# Patient Record
Sex: FEMALE | Race: WHITE | NOT HISPANIC OR LATINO | Employment: OTHER | URBAN - METROPOLITAN AREA
[De-identification: names, ages, dates, MRNs, and addresses within clinical notes are randomized per-mention and may not be internally consistent; named-entity substitution may affect disease eponyms.]

---

## 2017-07-25 ENCOUNTER — TELEPHONE (OUTPATIENT)
Dept: HEMATOLOGY/ONCOLOGY | Facility: CLINIC | Age: 72
End: 2017-07-25

## 2017-07-25 NOTE — TELEPHONE ENCOUNTER
----- Message from Polo Hamilton sent at 7/25/2017 10:28 AM CDT -----  Contact: Tarah Monson from Dr. Flakito Rascon would like a call back from staff in ref to pt refferal     Tarah can be reached at [phone]

## 2017-08-24 ENCOUNTER — OFFICE VISIT (OUTPATIENT)
Dept: HEMATOLOGY/ONCOLOGY | Facility: CLINIC | Age: 72
End: 2017-08-24
Payer: COMMERCIAL

## 2017-08-24 VITALS
SYSTOLIC BLOOD PRESSURE: 171 MMHG | OXYGEN SATURATION: 97 % | BODY MASS INDEX: 27.88 KG/M2 | WEIGHT: 147.69 LBS | HEIGHT: 61 IN | DIASTOLIC BLOOD PRESSURE: 90 MMHG | RESPIRATION RATE: 18 BRPM | HEART RATE: 64 BPM | TEMPERATURE: 98 F

## 2017-08-24 DIAGNOSIS — C90.00 MULTIPLE MYELOMA NOT HAVING ACHIEVED REMISSION: Primary | ICD-10-CM

## 2017-08-24 PROCEDURE — 1159F MED LIST DOCD IN RCRD: CPT | Mod: S$GLB,,, | Performed by: INTERNAL MEDICINE

## 2017-08-24 PROCEDURE — 3008F BODY MASS INDEX DOCD: CPT | Mod: S$GLB,,, | Performed by: INTERNAL MEDICINE

## 2017-08-24 PROCEDURE — 99999 PR PBB SHADOW E&M-EST. PATIENT-LVL III: CPT | Mod: PBBFAC,,, | Performed by: INTERNAL MEDICINE

## 2017-08-24 PROCEDURE — 1126F AMNT PAIN NOTED NONE PRSNT: CPT | Mod: S$GLB,,, | Performed by: INTERNAL MEDICINE

## 2017-08-24 PROCEDURE — 99215 OFFICE O/P EST HI 40 MIN: CPT | Mod: S$GLB,,, | Performed by: INTERNAL MEDICINE

## 2017-08-24 RX ORDER — CALCIUM CARBONATE/VITAMIN D3 600MG-5MCG
1 TABLET ORAL DAILY
COMMUNITY

## 2017-08-24 NOTE — PROGRESS NOTES
Subjective:       Patient ID: Sarah Skelton is a 72 y.o. female.    Chief Complaint: Multiple myeloma not having achieved remission    Sarah presents with her family (, daughter, and son-in-law) for evaluation of her IgG lambda multiple myeloma. She was diagnosed in November 2015 after presenting with severe fatigue, anemia, and renal failure. She started therapy with velcade, cytoxan and dexamethasone November 23, 2016 and remained on therapy until late 2016 when she started carfilzomib and dexamethasone. She did not tolerate the addition of Revlimid to Velcade due to severe side effects even at reduced dosing including cytopenias and rash. Her renal function has improved from a creatinine of 10-11 to 3-4, though never returned to normal. Urine lambda free light chains decreased from 10,000 to 4,000. She never required dialysis support. Her chronic renal failure may be a contributing factor in regards to her anemia and she is on erythropoietin support. Outside records indicate transfusion support of at least 9 units of PRBC near time of diagnosis.    Patient is here today for follow up, and was last seen when she established care with us for transplant discussion in December 2016. At that time patient was not keen on the idea of transplant, and the recommendation was made to continue carfilzomib/dexamethasone, reassess response, and if patient progressed and decided against transplant, to consider daratumumab or melphalan/thalidomide. Since that visit, patient has continued carfilzomib/dex until a 4.5 month interruption February-July 2017 due to complication of diverticulitis including perforation and colon resection on May 15, 2017. She had bone marrow restaging bone marrow biopsy on 6/5/17, which showed 50-60% plasma cells and normal cytogenetics. Her myeloma likely progressed through her treatment break. SPEP, immunoglobulins, FLCs not available in the records provided. Labs from 8/22/17 show WBC 4.5, Hgb  9.3, Plt 112, Cr 2.92, otherwise unremarkable.    Since resuming treatment on July 3rd, patient has continued to tolerate it well. She has grade 1 residual neuropathy (since Velcade, unchanged) but otherwise denies bone pain, weight loss. She is able to perform her routine daily activities without limitation. She states she does get mild dyspnea on exertion, but otherwise denies SOB, chest pain, bone pain, abdominal pain. Appetite is normal. Weight is stable. She continues to receive Procrit for her anemia.       Medical History  Hypertension  Anemia    Surgical History  Total Abdominal Hysterectomy  Colon resection for diverticular perforation    HPI  Review of Systems   Constitutional: Negative.    HENT: Negative.    Eyes: Negative.    Respiratory: Negative.    Cardiovascular: Negative.    Gastrointestinal: Negative.    Endocrine: Negative.    Genitourinary: Negative.    Musculoskeletal: Negative.    Skin: Negative.    Allergic/Immunologic: Negative.  Negative for environmental allergies, food allergies and immunocompromised state.   Neurological: Negative.         Mild BLE numbness   Hematological: Negative for adenopathy. Does not bruise/bleed easily.       Objective:       Vitals:    08/24/17 1429   BP: (!) 171/90   Pulse: 64   Resp: 18   Temp: 98.2 °F (36.8 °C)       Physical Exam   Constitutional: She is oriented to person, place, and time. She appears well-developed and well-nourished.   HENT:   Head: Normocephalic and atraumatic.   Right Ear: External ear normal.   Left Ear: External ear normal.   Nose: Nose normal.   Mouth/Throat: Oropharynx is clear and moist. No oropharyngeal exudate.   Eyes: Conjunctivae and EOM are normal. Pupils are equal, round, and reactive to light. No scleral icterus.   Neck: Normal range of motion. Neck supple. No JVD present.   Cardiovascular: Normal rate, regular rhythm, normal heart sounds and intact distal pulses.    No murmur heard.  Pulmonary/Chest: Effort normal and breath  sounds normal. No respiratory distress.   Abdominal: Soft. Bowel sounds are normal. She exhibits no distension. There is no hepatosplenomegaly. There is no tenderness.   Musculoskeletal: Normal range of motion. She exhibits no edema.   Neurological: She is alert and oriented to person, place, and time. No cranial nerve deficit.   Skin: Skin is warm and dry. No rash noted.   Psychiatric: She has a normal mood and affect. Her behavior is normal.   Nursing note and vitals reviewed.        Labs 8/22/17 (OSH):  WBC 4.5   Hgb 9.3  Hct 28.7  Plt 112  BUN 32.1  Cr 2.92  Glucose 128  Total protein 6.0  Albumin 4.0    All other labs normal. Myeloma labs not available.    Bone marrow biopsy results from 6/5/17 reviewed, as per HPI, scanned in media tab.    Assessment:   Mrs. Skelton is a 72 yof here for evaluation of IgG lambda multiple myeloma with associated renal disease and anemia, which have been stable. She likely had progression of diseas after her 4.5 month treatment break, however we do not have trend of M protein, FLCs, IgGs to review. Her most recent bone marrow on 6/5/17 does show 50-60% plasma cells, normal cytogenetics. She recently resumed kyprolis/dexamethasone July 3, 2017 and tolerating well. Continues with Procrit for her anemia, associated FISH.  She is open to transplant. We discussed again that she would need a response of  <20% plasma cells in bone marrow before proceeding to transplant. We would consider transplant even at age 71 with myeloma induced renal failure.     Plan:       - Recommend adding Pomalyst to her regimen to proceed with triple-drug therapy. Discussed that though Pomalyst is in same class as Revlimid, it has a much better tolerated side effect profile and patient agreeable to start.  - Recommend 4 cycles of Pomalyst/Kyprolis/Dexamethasone and then restaging marrow to assess candidacy for transplant. We would like to perform bone marrow here at [facility] so please inform us when patient is  scheduled for her last cycle, so that we may schedule her follow up appointment with us and bone marrow biopsy to start transplant planning.  - Also discussed daratumumab as an alternative adjunct agent should she have issues/intolerance to Pomalyst. Regimen would be Daratumumab/Kyprolis/Dexamethasone.  - If patient not a candidate for transplant per BMbx criteria, will recommend continued Pomalyst/Kyprolis/Dexamethasone if well tolerated and patient otherwise responding or Daratumumab/Kyprolis/Dexamethasone until progression.  - Discussed appointment with her dentist to initiate dental clearance process prior to potential autologous stem cell transplant.  - Please provide overall trend of her SPEP, FLCs, immunoglobulins for next carmelo.    Patient seen and staffed with Dr. Calzada.    Karley Conti MD  Heme-Onc Fellow    If Sarah decides against transplant, I would consider daratumumab and/or melphalan/thalidomide for future treatment options.    We discussed that the best treatment options at this time is what the patient is most comfortable with, keeping quality of life as a priority.    Visit 40 minutes, >50% counseling      Attestation:  Patient personally seen and evaluated with Dr. Conti. Agree with above assessment and plan. We discussed treatment options of Pomalyst/Kyprolis/Dexamethasone and Daratumumab/Kyprolis/Dexamethasone at length.  Sarah still has reservation about transplant primarily: 1. Becoming very sick from transplant side effects and 2. Being away from home for the month required for transplant. We discussed potential transplant side effects at length.  We discussed lodging options, both [facility] and [facility]. We discussed needing a caretaker the entire month that she is here. We discussed that someone can stay with her in the hospital the entire duration of the transplant.  Sarah is more willing to consider transplant at this time. We again discussed the long overall survival benefit of  transplant and use of maintenance Velcade after transplant to prolong progression free survival.  We would like her to return after the next 4 cycles of therapy to complete a marrow biopsy. If plasma cells are less than 20% and she agrees to transplant we will have 42 days to complete transplant evaluation.

## 2017-08-25 ENCOUNTER — TELEPHONE (OUTPATIENT)
Dept: HEMATOLOGY/ONCOLOGY | Facility: CLINIC | Age: 72
End: 2017-08-25

## 2017-08-25 DIAGNOSIS — C90.00 MULTIPLE MYELOMA NOT HAVING ACHIEVED REMISSION: Primary | ICD-10-CM

## 2017-08-25 NOTE — TELEPHONE ENCOUNTER
Called pt to schedule BMBX , pt was told to report to the second floor in the main hospital Same Day Surgery Dept. Pt was told to be NPO starting at midnight the day prior to surgery. Pt was advised to hold all blood thinning medications prior to BMBX & was advised to have a ride home. Pt voiced verbal understanding of these directions. Will also mail instruction sheet to pt's home.  Case request pended and routed to Irene Munoz.    Vidya Pepper

## 2017-08-31 ENCOUNTER — TELEPHONE (OUTPATIENT)
Dept: HEMATOLOGY/ONCOLOGY | Facility: CLINIC | Age: 72
End: 2017-08-31

## 2017-08-31 NOTE — TELEPHONE ENCOUNTER
----- Message from Milagro Akbar sent at 8/31/2017 10:52 AM CDT -----  Contact: PT  PT is calling regarding her transplant for January.  PT is needing possible reschedule.    Callback: [phone]

## 2017-08-31 NOTE — TELEPHONE ENCOUNTER
Returned call to patient. She wanted to reschedule her bone marrow biopsy till January. This was already done.

## 2017-09-19 ENCOUNTER — TELEPHONE (OUTPATIENT)
Dept: HEMATOLOGY/ONCOLOGY | Facility: CLINIC | Age: 72
End: 2017-09-19

## 2017-09-19 NOTE — TELEPHONE ENCOUNTER
Returned call to Juanis from [facility]. She stated that the patient had a reaction to Pomalyst. Fine red, itchy rash. They stopped the Pomalyst and the rash resolved.    Per Dr Calzada, the patient is to start Daratumumab/Kyprolis/Dexamethasone and discontinue Pomalyst.

## 2017-09-19 NOTE — TELEPHONE ENCOUNTER
----- Message from Nidhi Dash sent at 9/19/2017  3:15 PM CDT -----  Contact: Juanis from [facility] calling because the pt had an allergic reaction to a medication that was prescribed by Dr.Finn Olivarez call back number [phone]

## 2017-09-20 ENCOUNTER — TELEPHONE (OUTPATIENT)
Dept: HEMATOLOGY/ONCOLOGY | Facility: CLINIC | Age: 72
End: 2017-09-20

## 2017-09-20 NOTE — TELEPHONE ENCOUNTER
Attempted to reach [facility]. Recording stated that they were closed. I was unable to leave a message.

## 2017-09-22 ENCOUNTER — TELEPHONE (OUTPATIENT)
Dept: HEMATOLOGY/ONCOLOGY | Facility: CLINIC | Age: 72
End: 2017-09-22

## 2017-09-26 ENCOUNTER — TELEPHONE (OUTPATIENT)
Dept: HEMATOLOGY/ONCOLOGY | Facility: CLINIC | Age: 72
End: 2017-09-26

## 2017-09-26 NOTE — TELEPHONE ENCOUNTER
Attempted to return call. Left voicemail to return my call to clarify what they are needing from us.

## 2017-09-26 NOTE — TELEPHONE ENCOUNTER
----- Message from Jonatan Harris sent at 9/25/2017  1:38 PM CDT -----  Contact: [facility]  Will like a call regarding changes to  chemo therapy for pt     Contact::[phone]

## 2017-11-27 ENCOUNTER — TELEPHONE (OUTPATIENT)
Dept: HEMATOLOGY/ONCOLOGY | Facility: CLINIC | Age: 72
End: 2017-11-27

## 2017-11-27 NOTE — TELEPHONE ENCOUNTER
----- Message from Danya Gutierrez MA sent at 11/27/2017 11:21 AM CST -----  Contact: Dr. Fraga office      ----- Message -----  From: Jonatan Harris  Sent: 11/27/2017   9:43 AM  To: [facility] Staff    Will like a call from Beryl regarding mutual pt     Contact::[phone]

## 2017-11-27 NOTE — TELEPHONE ENCOUNTER
Returned call to [facility]/Dr Fraga's office.   She wanted our fax number to fax labs and other info and stated that they wanted to scheduled an appointment to be included in study that was discussed.

## 2017-12-08 DIAGNOSIS — C90.00 MULTIPLE MYELOMA NOT HAVING ACHIEVED REMISSION: Primary | ICD-10-CM

## 2017-12-29 ENCOUNTER — LAB VISIT (OUTPATIENT)
Dept: LAB | Facility: HOSPITAL | Age: 72
End: 2017-12-29
Attending: INTERNAL MEDICINE
Payer: COMMERCIAL

## 2017-12-29 ENCOUNTER — OFFICE VISIT (OUTPATIENT)
Dept: HEMATOLOGY/ONCOLOGY | Facility: CLINIC | Age: 72
End: 2017-12-29
Payer: COMMERCIAL

## 2017-12-29 VITALS
RESPIRATION RATE: 18 BRPM | BODY MASS INDEX: 27.06 KG/M2 | HEIGHT: 61 IN | HEART RATE: 74 BPM | DIASTOLIC BLOOD PRESSURE: 91 MMHG | SYSTOLIC BLOOD PRESSURE: 174 MMHG | OXYGEN SATURATION: 98 % | TEMPERATURE: 98 F | WEIGHT: 143.31 LBS

## 2017-12-29 DIAGNOSIS — C90.00 MULTIPLE MYELOMA NOT HAVING ACHIEVED REMISSION: ICD-10-CM

## 2017-12-29 DIAGNOSIS — C90.00 MULTIPLE MYELOMA NOT HAVING ACHIEVED REMISSION: Primary | ICD-10-CM

## 2017-12-29 LAB
ALBUMIN SERPL BCP-MCNC: 3.8 G/DL
ALP SERPL-CCNC: 66 U/L
ALT SERPL W/O P-5'-P-CCNC: 16 U/L
ANION GAP SERPL CALC-SCNC: 9 MMOL/L
AST SERPL-CCNC: 21 U/L
BASOPHILS # BLD AUTO: 0.04 K/UL
BASOPHILS NFR BLD: 0.7 %
BILIRUB SERPL-MCNC: 0.5 MG/DL
BUN SERPL-MCNC: 31 MG/DL
CALCIUM SERPL-MCNC: 9.9 MG/DL
CHLORIDE SERPL-SCNC: 106 MMOL/L
CO2 SERPL-SCNC: 25 MMOL/L
CREAT SERPL-MCNC: 3.2 MG/DL
DIFFERENTIAL METHOD: ABNORMAL
EOSINOPHIL # BLD AUTO: 0.3 K/UL
EOSINOPHIL NFR BLD: 5.9 %
ERYTHROCYTE [DISTWIDTH] IN BLOOD BY AUTOMATED COUNT: 15.1 %
EST. GFR  (AFRICAN AMERICAN): 16.1 ML/MIN/1.73 M^2
EST. GFR  (NON AFRICAN AMERICAN): 13.9 ML/MIN/1.73 M^2
GLUCOSE SERPL-MCNC: 152 MG/DL
HCT VFR BLD AUTO: 28.8 %
HGB BLD-MCNC: 9.2 G/DL
IMM GRANULOCYTES # BLD AUTO: 0.05 K/UL
IMM GRANULOCYTES NFR BLD AUTO: 0.9 %
LYMPHOCYTES # BLD AUTO: 0.8 K/UL
LYMPHOCYTES NFR BLD: 13.9 %
MCH RBC QN AUTO: 30.3 PG
MCHC RBC AUTO-ENTMCNC: 31.9 G/DL
MCV RBC AUTO: 95 FL
MONOCYTES # BLD AUTO: 0.3 K/UL
MONOCYTES NFR BLD: 5.4 %
NEUTROPHILS # BLD AUTO: 4.2 K/UL
NEUTROPHILS NFR BLD: 73.2 %
NRBC BLD-RTO: 0 /100 WBC
PLATELET # BLD AUTO: 175 K/UL
PMV BLD AUTO: 10.6 FL
POTASSIUM SERPL-SCNC: 3.8 MMOL/L
PROT SERPL-MCNC: 6.4 G/DL
RBC # BLD AUTO: 3.04 M/UL
SODIUM SERPL-SCNC: 140 MMOL/L
WBC # BLD AUTO: 5.77 K/UL

## 2017-12-29 PROCEDURE — 85025 COMPLETE CBC W/AUTO DIFF WBC: CPT

## 2017-12-29 PROCEDURE — 83520 IMMUNOASSAY QUANT NOS NONAB: CPT | Mod: 59

## 2017-12-29 PROCEDURE — 36415 COLL VENOUS BLD VENIPUNCTURE: CPT

## 2017-12-29 PROCEDURE — 84165 PROTEIN E-PHORESIS SERUM: CPT

## 2017-12-29 PROCEDURE — 99215 OFFICE O/P EST HI 40 MIN: CPT | Mod: S$GLB,,, | Performed by: INTERNAL MEDICINE

## 2017-12-29 PROCEDURE — 86334 IMMUNOFIX E-PHORESIS SERUM: CPT

## 2017-12-29 PROCEDURE — 84165 PROTEIN E-PHORESIS SERUM: CPT | Mod: 26,,, | Performed by: PATHOLOGY

## 2017-12-29 PROCEDURE — 99999 PR PBB SHADOW E&M-EST. PATIENT-LVL III: CPT | Mod: PBBFAC,,, | Performed by: INTERNAL MEDICINE

## 2017-12-29 PROCEDURE — 80053 COMPREHEN METABOLIC PANEL: CPT

## 2017-12-29 PROCEDURE — 86334 IMMUNOFIX E-PHORESIS SERUM: CPT | Mod: 26,,, | Performed by: PATHOLOGY

## 2018-01-02 ENCOUNTER — TELEPHONE (OUTPATIENT)
Dept: HEMATOLOGY/ONCOLOGY | Facility: CLINIC | Age: 73
End: 2018-01-02

## 2018-01-02 LAB
ALBUMIN SERPL ELPH-MCNC: 4.13 G/DL
ALPHA1 GLOB SERPL ELPH-MCNC: 0.33 G/DL
ALPHA2 GLOB SERPL ELPH-MCNC: 0.71 G/DL
B-GLOBULIN SERPL ELPH-MCNC: 0.6 G/DL
GAMMA GLOB SERPL ELPH-MCNC: 0.43 G/DL
KAPPA LC SER QL IA: 0.07 MG/DL
KAPPA LC/LAMBDA SER IA: 0
LAMBDA LC SER QL IA: 1222 MG/DL
PATHOLOGIST INTERPRETATION SPE: NORMAL
PROT SERPL-MCNC: 6.2 G/DL

## 2018-01-02 NOTE — TELEPHONE ENCOUNTER
----- Message from Jonatan Harris sent at 1/2/2018 10:26 AM CST -----  Contact: Tiffany Fraga   Will like appt notes faxed over to office    Contact::[phone]  Fax::[phone]

## 2018-01-02 NOTE — PROGRESS NOTES
Subjective:       Patient ID: Sarah Skelton is a 72 y.o. female.    Chief Complaint: Multiple Myeloma    Sarah presents with her family (, daughter, and son-in-law) for evaluation of her IgG lambda multiple myeloma. She was diagnosed in November 2015 after presenting with severe fatigue, anemia, and renal failure. She started therapy with velcade, cytoxan and dexamethasone November 23, 2016 and remained on therapy until late 2016 when she started carfilzomib and dexamethasone. She did not tolerate the addition of Revlimid to Velcade due to severe side effects even at reduced dosing including cytopenias and rash. Her renal function has improved from a creatinine of 10-11 to 3-4, though never returned to normal. Urine lambda free light chains decreased from 10,000 to 4,000. She never required dialysis support. Her chronic renal failure may be a contributing factor in regards to her anemia and she is on erythropoietin support. Outside records indicate transfusion support of at least 9 units of PRBC near time of diagnosis.    Patient is here today for follow up, and was last seen in August 2017 to plan additional therapy and again consider stem cell transplant. Recommendation then was to add Pomalidomide to Kyprolis and Dexamethasone. This was complicated by intolerance to Pomalidomide. Then regimen was changed to Kyprolis, Daratumumab, Dexamethasone. But disease seems to be progressing with renal failure and urine monoclonal proteins.    Consideration was then given to enrollment in clinical trial with Cellectar, radioactive iodine 131 single therapy. Renal function screened by outside records and again at lab collection today precludes treatment. Consideration is now given to additional myeloma therapy including Ninlaro, Panobinostat, and Cytoxan.       Medical History  Hypertension  Anemia    Surgical History  Total Abdominal Hysterectomy  Colon resection for diverticular perforation    HPI  Review of Systems    Constitutional: Negative.    HENT: Negative.    Eyes: Negative.    Respiratory: Negative.    Cardiovascular: Negative.    Gastrointestinal: Negative.    Endocrine: Negative.    Genitourinary: Negative.    Musculoskeletal: Negative.    Skin: Negative.    Allergic/Immunologic: Negative.  Negative for environmental allergies, food allergies and immunocompromised state.   Neurological: Negative.         Mild BLE numbness   Hematological: Negative for adenopathy. Does not bruise/bleed easily.       Objective:       Vitals:    12/29/17 1321   BP: (!) 174/91   Pulse: 74   Resp: 18   Temp: 97.9 °F (36.6 °C)       Physical Exam   Constitutional: She is oriented to person, place, and time. She appears well-developed and well-nourished.   HENT:   Head: Normocephalic and atraumatic.   Right Ear: External ear normal.   Left Ear: External ear normal.   Nose: Nose normal.   Mouth/Throat: Oropharynx is clear and moist. No oropharyngeal exudate.   Eyes: Conjunctivae and EOM are normal. Pupils are equal, round, and reactive to light. No scleral icterus.   Neck: Normal range of motion. Neck supple. No JVD present.   Cardiovascular: Normal rate, regular rhythm, normal heart sounds and intact distal pulses.    No murmur heard.  Pulmonary/Chest: Effort normal and breath sounds normal. No respiratory distress.   Abdominal: Soft. Bowel sounds are normal. She exhibits no distension. There is no hepatosplenomegaly. There is no tenderness.   Musculoskeletal: Normal range of motion. She exhibits no edema.   Neurological: She is alert and oriented to person, place, and time. No cranial nerve deficit.   Skin: Skin is warm and dry. No rash noted.   Psychiatric: She has a normal mood and affect. Her behavior is normal.   Nursing note and vitals reviewed.        Labs 8/22/17 (OSH):  WBC 4.5   Hgb 9.3  Hct 28.7  Plt 112  BUN 32.1  Cr 2.92  Glucose 128  Total protein 6.0  Albumin 4.0    All other labs normal. Myeloma labs not available.    Bone  marrow biopsy results from 6/5/17 reviewed, as per HPI, scanned in media tab.    Assessment:   Mrs. Skelton is a 72 yof here for evaluation of IgG lambda multiple myeloma with associated renal disease and anemia, which have been stable. She likely had progression of diseas after her 4.5 month treatment break, however we do not have trend of M protein, FLCs, IgGs to review. Her most recent bone marrow on 6/5/17 showed 50-60% plasma cells, normal cytogenetics. She recently resumed kyprolis/dexamethasone July 3, 2017 followed by intolerance to pomalidomide and progression on Daratumumab/Kyprolis/Dexamethasone.  She is still open to transplant. We discussed again that she would need a response of  <20% plasma cells in bone marrow before proceeding to transplant. We would consider transplant even at age 72 with myeloma induced renal failure.     Plan:       Discussed remaining treatment options even though she is not a candidate for our current clinical trials due to renal function.  I do not recommend Panobinostat. In our experience all patients have intolerable diarrhea as a side effect. All patients in our practice have quit this medication and we rarely prescribe at this time.  I would next try weekly Ninlaro/Dexamethasone. The patient has responded well to proteosome therapy in the past. The treatment is usually well tolerated and response improves the longer patients are on therapy.   I would reserve high dose cytoxan or even retrial of CyBorD after trial of Ninlaro/Dexamethasone or for rapidly progressing myeloma.  We would also continue to consider clinical trial therapy if renal function improves.  Above discussed with patient and her family. Lab evaluation from visit today pending (SPEP and free light chains).

## 2018-01-03 LAB
INTERPRETATION SERPL IFE-IMP: NORMAL
PATHOLOGIST INTERPRETATION IFE: NORMAL

## 2018-01-24 ENCOUNTER — TELEPHONE (OUTPATIENT)
Dept: HEMATOLOGY/ONCOLOGY | Facility: CLINIC | Age: 73
End: 2018-01-24

## 2018-01-24 NOTE — TELEPHONE ENCOUNTER
Spoke to patient regarding bone biopsy is cancelled per Dr Calzada.  Verbalized understanding.        ----- Message from Nidhi Dash sent at 1/24/2018  1:55 PM CST -----  Contact: Pt  Pt calling stating that she keeps receiving calls to remind her of a bone marrow biopsy for tomorrow. But she thought  told her it would be cancelled. She needs to know if she is supposed to be having this procedure done or not      Pt call back number

## 2019-02-20 ENCOUNTER — TELEPHONE (OUTPATIENT)
Dept: HEMATOLOGY/ONCOLOGY | Facility: CLINIC | Age: 74
End: 2019-02-20

## 2019-02-20 NOTE — TELEPHONE ENCOUNTER
Spoke to .  Would like lodging at [facility] for this appt.  Spoke to Deangelo, , informed me that patient could be put on the waiting list or get a discounted room at the [facility].   asked to be put on waiting list.  Informed deangelo        ----- Message from [employer] sent at 2/20/2019  1:48 PM CST -----  Contact: Pt's  Mr Skelton   Pt's  Mr Skelton would like a call back to reschedule the pt's 2/28/2019 appts and would like a call back to discuss the date that the pt will be scheduled.    Mr Skelton can be reached at [phone].    Thanks

## 2019-02-28 ENCOUNTER — OFFICE VISIT (OUTPATIENT)
Dept: HEMATOLOGY/ONCOLOGY | Facility: CLINIC | Age: 74
End: 2019-02-28
Payer: COMMERCIAL

## 2019-02-28 ENCOUNTER — TELEPHONE (OUTPATIENT)
Dept: HEMATOLOGY/ONCOLOGY | Facility: CLINIC | Age: 74
End: 2019-02-28

## 2019-02-28 ENCOUNTER — LAB VISIT (OUTPATIENT)
Dept: LAB | Facility: HOSPITAL | Age: 74
End: 2019-02-28
Attending: INTERNAL MEDICINE
Payer: COMMERCIAL

## 2019-02-28 VITALS
HEART RATE: 98 BPM | TEMPERATURE: 98 F | DIASTOLIC BLOOD PRESSURE: 87 MMHG | SYSTOLIC BLOOD PRESSURE: 143 MMHG | WEIGHT: 143 LBS | RESPIRATION RATE: 18 BRPM | OXYGEN SATURATION: 100 % | BODY MASS INDEX: 27 KG/M2 | HEIGHT: 61 IN

## 2019-02-28 DIAGNOSIS — C90.00 MULTIPLE MYELOMA NOT HAVING ACHIEVED REMISSION: Primary | ICD-10-CM

## 2019-02-28 DIAGNOSIS — C90.00 MULTIPLE MYELOMA NOT HAVING ACHIEVED REMISSION: ICD-10-CM

## 2019-02-28 LAB
ALBUMIN SERPL BCP-MCNC: 3.4 G/DL
ALP SERPL-CCNC: 52 U/L
ALT SERPL W/O P-5'-P-CCNC: 19 U/L
ANION GAP SERPL CALC-SCNC: 12 MMOL/L
ANISOCYTOSIS BLD QL SMEAR: SLIGHT
AST SERPL-CCNC: 16 U/L
BASOPHILS NFR BLD: 0 %
BILIRUB SERPL-MCNC: 0.4 MG/DL
BUN SERPL-MCNC: 39 MG/DL
CALCIUM SERPL-MCNC: 8.5 MG/DL
CHLORIDE SERPL-SCNC: 107 MMOL/L
CO2 SERPL-SCNC: 23 MMOL/L
CREAT SERPL-MCNC: 4 MG/DL
DIFFERENTIAL METHOD: ABNORMAL
EOSINOPHIL NFR BLD: 0 %
ERYTHROCYTE [DISTWIDTH] IN BLOOD BY AUTOMATED COUNT: 15.5 %
EST. GFR  (AFRICAN AMERICAN): 12.2 ML/MIN/1.73 M^2
EST. GFR  (NON AFRICAN AMERICAN): 10.6 ML/MIN/1.73 M^2
GLUCOSE SERPL-MCNC: 128 MG/DL
HCT VFR BLD AUTO: 28.1 %
HGB BLD-MCNC: 9.3 G/DL
HYPOCHROMIA BLD QL SMEAR: ABNORMAL
IMM GRANULOCYTES # BLD AUTO: ABNORMAL K/UL
IMM GRANULOCYTES NFR BLD AUTO: ABNORMAL %
LYMPHOCYTES NFR BLD: 62 %
MCH RBC QN AUTO: 29.2 PG
MCHC RBC AUTO-ENTMCNC: 33.1 G/DL
MCV RBC AUTO: 88 FL
METAMYELOCYTES NFR BLD MANUAL: 1 %
MONOCYTES NFR BLD: 1 %
NEUTROPHILS NFR BLD: 36 %
NRBC BLD-RTO: 0 /100 WBC
OVALOCYTES BLD QL SMEAR: ABNORMAL
PLATELET # BLD AUTO: 20 K/UL
PLATELET BLD QL SMEAR: ABNORMAL
PMV BLD AUTO: ABNORMAL FL
POIKILOCYTOSIS BLD QL SMEAR: SLIGHT
POLYCHROMASIA BLD QL SMEAR: ABNORMAL
POTASSIUM SERPL-SCNC: 4 MMOL/L
PROT SERPL-MCNC: 6.5 G/DL
RBC # BLD AUTO: 3.19 M/UL
SODIUM SERPL-SCNC: 142 MMOL/L
WBC # BLD AUTO: 1.66 K/UL

## 2019-02-28 PROCEDURE — 36415 COLL VENOUS BLD VENIPUNCTURE: CPT

## 2019-02-28 PROCEDURE — 84165 PATHOLOGIST INTERPRETATION SPE: ICD-10-PCS | Mod: 26,,, | Performed by: PATHOLOGY

## 2019-02-28 PROCEDURE — 85007 BL SMEAR W/DIFF WBC COUNT: CPT

## 2019-02-28 PROCEDURE — 99215 OFFICE O/P EST HI 40 MIN: CPT | Mod: S$GLB,,, | Performed by: INTERNAL MEDICINE

## 2019-02-28 PROCEDURE — 99999 PR PBB SHADOW E&M-EST. PATIENT-LVL III: ICD-10-PCS | Mod: PBBFAC,,, | Performed by: INTERNAL MEDICINE

## 2019-02-28 PROCEDURE — 84165 PROTEIN E-PHORESIS SERUM: CPT

## 2019-02-28 PROCEDURE — 80053 COMPREHEN METABOLIC PANEL: CPT

## 2019-02-28 PROCEDURE — 85027 COMPLETE CBC AUTOMATED: CPT

## 2019-02-28 PROCEDURE — 84165 PROTEIN E-PHORESIS SERUM: CPT | Mod: 26,,, | Performed by: PATHOLOGY

## 2019-02-28 PROCEDURE — 83520 IMMUNOASSAY QUANT NOS NONAB: CPT | Mod: 59

## 2019-02-28 PROCEDURE — 99215 PR OFFICE/OUTPT VISIT, EST, LEVL V, 40-54 MIN: ICD-10-PCS | Mod: S$GLB,,, | Performed by: INTERNAL MEDICINE

## 2019-02-28 PROCEDURE — 99999 PR PBB SHADOW E&M-EST. PATIENT-LVL III: CPT | Mod: PBBFAC,,, | Performed by: INTERNAL MEDICINE

## 2019-03-01 LAB
ALBUMIN SERPL ELPH-MCNC: 3.66 G/DL
ALPHA1 GLOB SERPL ELPH-MCNC: 0.4 G/DL
ALPHA2 GLOB SERPL ELPH-MCNC: 0.61 G/DL
B-GLOBULIN SERPL ELPH-MCNC: 0.55 G/DL
GAMMA GLOB SERPL ELPH-MCNC: 0.98 G/DL
KAPPA LC SER QL IA: 0.1 MG/DL
KAPPA LC/LAMBDA SER IA: 0
LAMBDA LC SER QL IA: 101.6 MG/DL
PATHOLOGIST INTERPRETATION SPE: NORMAL
PROT SERPL-MCNC: 6.2 G/DL

## 2019-03-01 NOTE — PROGRESS NOTES
Subjective:       Patient ID: Sarah Skelton is a 73 y.o. female.    Chief Complaint: Multiple Myeloma    Follow-up 1 year since last evaluation.    Hematology Treatment History    VRD chemotherapy - Allergic reaction to revilimid    CyBorD chemotherapy - Response and improvement in renal function    Carfilzomib Dexamethasone    Pomalidomide, Carfilzomib, Dexamethasone - allergy/intolerance to pomalidomide    Carfilzomib, Daratumab, Dexamethasone - disease progression, including renal failure    Ninlaro/ Dexamethasone - disease progression    Melphalan/Prednisone - Intolerance and pancytopenia    Initial Consultation  Sarah presents with her family (, daughter, and son-in-law) for evaluation of her IgG lambda multiple myeloma. She was diagnosed in November 2015 after presenting with severe fatigue, anemia, and renal failure. She started therapy with velcade, cytoxan and dexamethasone November 23, 2016 and remained on therapy until late 2016 when she started carfilzomib and dexamethasone. She did not tolerate the addition of Revlimid to Velcade due to severe side effects even at reduced dosing including cytopenias and rash. Her renal function has improved from a creatinine of 10-11 to 3-4, though never returned to normal. Urine lambda free light chains decreased from 10,000 to 4,000. She never required dialysis support. Her chronic renal failure may be a contributing factor in regards to her anemia and she is on erythropoietin support. Outside records indicate transfusion support of at least 9 units of PRBC near time of diagnosis.    Follow-up 2/28/19  Sarah has been off therapy since August 2018 due to pancytopenia. Now having increasing transfusion support. Also having significant fatigue. Renal failure is present again.  Requiring blood and platelets weekly.  Lambda light chains were over 1000 in January 2019. Our repeat labs today are pending.       Medical History  Hypertension  Anemia    Surgical  History  Total Abdominal Hysterectomy  Colon resection for diverticular perforation    HPI  Review of Systems   Constitutional: Negative.    HENT: Negative.    Eyes: Negative.    Respiratory: Negative.    Cardiovascular: Negative.    Gastrointestinal: Negative.    Endocrine: Negative.    Genitourinary: Negative.    Musculoskeletal: Negative.    Skin: Negative.    Allergic/Immunologic: Negative.  Negative for environmental allergies, food allergies and immunocompromised state.   Neurological: Negative.         Mild BLE numbness   Hematological: Negative for adenopathy. Does not bruise/bleed easily.       Objective:       Vitals:    02/28/19 1612   BP: (!) 143/87   Pulse: 98   Resp: 18   Temp: 98 °F (36.7 °C)       Physical Exam   Constitutional: She is oriented to person, place, and time. She appears well-developed and well-nourished.   HENT:   Head: Normocephalic and atraumatic.   Right Ear: External ear normal.   Left Ear: External ear normal.   Nose: Nose normal.   Mouth/Throat: Oropharynx is clear and moist. No oropharyngeal exudate.   Eyes: Conjunctivae and EOM are normal. Pupils are equal, round, and reactive to light. No scleral icterus.   Neck: Normal range of motion. Neck supple. No JVD present.   Cardiovascular: Normal rate, regular rhythm, normal heart sounds and intact distal pulses.   No murmur heard.  Pulmonary/Chest: Effort normal and breath sounds normal. No respiratory distress.   Abdominal: Soft. Bowel sounds are normal. She exhibits no distension. There is no hepatosplenomegaly. There is no tenderness.   Musculoskeletal: Normal range of motion. She exhibits no edema.   Neurological: She is alert and oriented to person, place, and time. No cranial nerve deficit.   Skin: Skin is warm and dry. No rash noted.   Psychiatric: She has a normal mood and affect. Her behavior is normal.   Nursing note and vitals reviewed.        Assessment:   Mrs. Skelton is a 72 yof here for evaluation of IgG lambda  multiple myeloma with associated renal disease and anemia.  She is currently transfusion dependent with renal failure and severe fatigue.    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 Our repeat labs are pending.     Recommend bone marrow biopsy- if significant myeloma is present recommend going ahead and adding weekly velcade injection to current weekly dexamethasone 20mg.   If aplasia is found we would consider growth factor support.    Will discuss with Dr. Perry. Please forward note.